# Patient Record
Sex: FEMALE | Race: WHITE
[De-identification: names, ages, dates, MRNs, and addresses within clinical notes are randomized per-mention and may not be internally consistent; named-entity substitution may affect disease eponyms.]

---

## 2017-01-04 ENCOUNTER — HOSPITAL ENCOUNTER (OUTPATIENT)
Dept: HOSPITAL 75 - RAD | Age: 37
End: 2017-01-04
Attending: NURSE PRACTITIONER
Payer: COMMERCIAL

## 2017-01-04 DIAGNOSIS — R22.42: ICD-10-CM

## 2017-01-04 DIAGNOSIS — M79.605: Primary | ICD-10-CM

## 2017-01-04 NOTE — DIAGNOSTIC IMAGING REPORT
PROCEDURE: US left lower extremity venous.



TECHNIQUE: Multiple real-time grayscale images were obtained

over the left lower extremity in various projections. Additional

duplex Doppler and color Doppler images were also obtained.



INDICATION:  Leg pain, swelling and redness.



The femoropopliteal deep venous system was widely patent. No

deep vein thrombosis identified. This occlusion throughout the

mid to lower third of the greater saphenous vein however.



IMPRESSION:

Superficial thrombus involves the greater saphenous throughout

the majority of the thigh. However the femoral popliteal deep

venous system was widely patent.



Dictated by:



Dictated on workstation # [id number]

## 2017-01-04 NOTE — XMS REPORT
Continuity of Care Document

 Created on: 2017



Felicitas Plaza

External Reference #: [id number]

: 1980

Sex: Female



Demographics







 Address  [address] [place]  [address]

 

 Home Phone  [phone]

 

 Preferred Language  English

 

 Marital Status  Unknown

 

 Christianity Affiliation  UNK

 

 Race  Unknown

 

 Ethnic Group  Unknown





Author







 Author  [facility]

 

 Organization  [facility]

 

 Address  Unknown

 

 Phone  Unavailable







Support







 Name  Relationship  Address  Phone

 

 , Nikki Fowler  [employer]  [address]

[place]  [address]  [phone]







Care Team Providers







 Care Team Member Name  Role  Phone

 

 No Pcp, Na  PCP  Unavailable







Source Comments

Some departments are not documenting in the electronic medical record.  If you 
do not see the information that you expected, contact Release of Information in 
the Health Information Management department at [phone] for further 
assistance in locating additional records.[facility]



Active Allergies and Adverse Reactions

No Known Allergies



Current Medications







      



  Prescription   Sig.   Disp.   Refills   Start   End Date   Status



      Date  

 

      



  PRENATAL VIT   Take  by mouth daily.       Active



  W-CA,FE,FA(<1 MG)      



  (PRENATAL VITAMIN PO)      

 

      



  CALCIUM CARBONATE/VITAMIN   Take  by mouth daily.       Active



  D3 (CALCIUM + D PO)      







Active Problems







 



  Problem   Noted Date

 

 



  Donor of stem cell   2016







Social History







    



  Tobacco Use   Types   Packs/Day   Years Used   Date

 

    



  Never Smoker    









   



  Smokeless Tobacco: Never   



  Used   







Last Filed Vital Signs







  



  Vital Sign   Reading   Time Taken

 

  



  Blood Pressure   116/76   2016  7:00 AM CDT

 

  



  Pulse   80   2016  7:00 AM CDT

 

  



  Temperature   36.6   C (97.9   F)   2016  7:00 AM CDT

 

  



  Respiratory Rate   16   2016  7:00 AM CDT

 

  



  Height   1.6 m (5' 3")   2016  7:00 AM CDT

 

  



  Weight   67.586 kg (149 lb)   2016  7:00 AM CDT

 

  



  Body Mass Index   26.4   2016  7:00 AM CDT

 

  



  Oxygen Saturation   100%   2016  7:00 AM CDT







Plan of Care







   



  Health Maintenance   Due Date   Last Done   Comments

 

   



  Physical (Comprehensive)   1986  



  Exam   

 

   



  Pertussis Vaccine   1990  

 

   



  Tetanus Vaccine   1996  

 

   



  Cervical Cancer Screening   2000  

 

   



  Influenza Vaccine   2016  







Results from Last 3 Months

Not on file

## 2017-01-17 ENCOUNTER — HOSPITAL ENCOUNTER (OUTPATIENT)
Dept: HOSPITAL 75 - RAD | Age: 37
End: 2017-01-17
Attending: OBSTETRICS & GYNECOLOGY
Payer: COMMERCIAL

## 2017-01-17 DIAGNOSIS — Z12.31: Primary | ICD-10-CM

## 2017-01-17 NOTE — XMS REPORT
Continuity of Care Document

 Created on: 2017



Felicitas Plaza

External Reference #: [id number]

: 1980

Sex: Female



Demographics







 Address  [address] [place]  [address]

 

 Home Phone  [phone]

 

 Preferred Language  English

 

 Marital Status  Unknown

 

 Yarsanism Affiliation  UNK

 

 Race  Unknown

 

 Ethnic Group  Unknown





Author







 Author  [facility]

 

 Organization  [facility]

 

 Address  Unknown

 

 Phone  Unavailable







Support







 Name  Relationship  Address  Phone

 

 , Nikki Fowler  [employer]  [address]

[place]  [address]  [phone]







Care Team Providers







 Care Team Member Name  Role  Phone

 

 No Pcp, Na  PCP  Unavailable







Source Comments

Some departments are not documenting in the electronic medical record.  If you 
do not see the information that you expected, contact Release of Information in 
the Health Information Management department at [phone] for further 
assistance in locating additional records.[facility]



Active Allergies and Adverse Reactions

No Known Allergies



Current Medications







      



  Prescription   Sig.   Disp.   Refills   Start   End Date   Status



      Date  

 

      



  PRENATAL VIT   Take  by mouth daily.       Active



  W-CA,FE,FA(<1 MG)      



  (PRENATAL VITAMIN PO)      

 

      



  CALCIUM CARBONATE/VITAMIN   Take  by mouth daily.       Active



  D3 (CALCIUM + D PO)      







Active Problems







 



  Problem   Noted Date

 

 



  Donor of stem cell   2016







Social History







    



  Tobacco Use   Types   Packs/Day   Years Used   Date

 

    



  Never Smoker    









   



  Smokeless Tobacco: Never   



  Used   







Last Filed Vital Signs







  



  Vital Sign   Reading   Time Taken

 

  



  Blood Pressure   116/76   2016  7:00 AM CDT

 

  



  Pulse   80   2016  7:00 AM CDT

 

  



  Temperature   36.6   C (97.9   F)   2016  7:00 AM CDT

 

  



  Respiratory Rate   16   2016  7:00 AM CDT

 

  



  Height   1.6 m (5' 3")   2016  7:00 AM CDT

 

  



  Weight   67.586 kg (149 lb)   2016  7:00 AM CDT

 

  



  Body Mass Index   26.4   2016  7:00 AM CDT

 

  



  Oxygen Saturation   100%   2016  7:00 AM CDT







Plan of Care







   



  Health Maintenance   Due Date   Last Done   Comments

 

   



  Physical (Comprehensive)   1986  



  Exam   

 

   



  Pertussis Vaccine   1990  

 

   



  Tetanus Vaccine   1996  

 

   



  Cervical Cancer Screening   2000  

 

   



  Influenza Vaccine   2016  







Results from Last 3 Months

Not on file

## 2017-01-17 NOTE — DIAGNOSTIC IMAGING REPORT
Bilateral screening mammogram.



The current study was also evaluated with a Computer Aided

Detection (CAD) system.



INDICATION: Screening. No current complaints stated on the

questionnaire.



COMPARISON: None. This is a baseline study.



FINDINGS:



The breasts are composed of heterogeneously dense parenchyma

which may decrease mammographic sensitivity. There are punctate

benign-appearing calcifications seen. Allowing for technique and

positional differences, no suspicious change is seen.



IMPRESSION: Dense breasts with no definite change.



ACR BI-RADS Category 2: Benign findings.

Result letter will be mailed to the patient.

Note: At least 10% of breast cancer is not imaged by mammography.



Dictated by:



Dictated on workstation # [id number]

## 2018-08-08 ENCOUNTER — HOSPITAL ENCOUNTER (EMERGENCY)
Dept: HOSPITAL 75 - ER | Age: 38
Discharge: HOME | End: 2018-08-08
Payer: COMMERCIAL

## 2018-08-08 VITALS — BODY MASS INDEX: 26.22 KG/M2 | HEIGHT: 63 IN | WEIGHT: 148 LBS

## 2018-08-08 VITALS — DIASTOLIC BLOOD PRESSURE: 85 MMHG | SYSTOLIC BLOOD PRESSURE: 128 MMHG

## 2018-08-08 DIAGNOSIS — I80.01: Primary | ICD-10-CM

## 2018-08-08 NOTE — XMS REPORT
Continuity of Care Document

 Created on: 2013



[place] PLAZA

External Reference #: [id number]

: 1980

Sex: Female



Demographics







 Address  [address] [place]  [address]

 

 Home Phone  [phone]

 

 Preferred Language  Unknown

 

 Marital Status  Unknown

 

 Protestant Affiliation  Unknown

 

 Race  Unknown

 

 Ethnic Group  Unknown





Author







 Author  [facility]

 

 Organization  [facility]

 

 Address  Unknown

 

 Phone  Unavailable







Support







 Name  Relationship  Address  Phone

 

 POP PLAZA  Next Of Kin  [address] [place]  [address] [phone]







Care Team Providers







 Care Team Member Name  Role  Phone

 

 LUISA FUENTES DO  PP  [phone]



                                            



Insurance Providers

                      





 Payer Name                    Policy Number                    Subscriber Name
                    Relationship                

 

 [facility] [facility]                    [id number]                    [place]                    01 Self / Same As Patient                



                                                                    



Advance Directives

                      





 Directive                    Response                    Recorded Date        
        

 

 Advance Directives                    N                    09/15/13 9:26pm    
            

 

 Health Care Power of                     N                    09/15/13 
9:26pm                

 

 Organ Donor                    Y                    09/15/13 9:26pm           
     



                                                                               
         



Problems

          No Known Problems or Medical conditions.                             
                                                           



Allergies, Adverse Reactions, Alerts

                      





 Allergen                    Type                    Severity                   
 Reaction                    Last Updated                

 

 No Known Drug Allergies                                                       
                            09/15/13                



                                                                               
                   



Medications

                      





 Medication                    Dose                    Units                    
Route                    Sig                    Qty                    Days    
            

 

 Prenatal Vit #108/Iron/Fa (Prenatal One Tablet)                    1          
          Each                    PO                    DAILY                  
                                        



                                                                              



Pregnancy

                      





 Response                    Recorded Date/Time                

 

 Status not known                    Unknown                



                                                                              



Results

                      





 Test                    Date                    Result                    
Interp.                    Ref. Range                

 

 Alanine Aminotransferase (ALT/SGPT)                    September 15, 2013 10:
05pm                    27  U/L                    L                    30-65  
              

 

 Albumin                    September 15, 2013 10:05pm                    2.0  G
/DL                    L                    3.4-5.0                

 

 Alkaline Phosphatase                    September 15, 2013 10:05pm            
        121  U/L                    N                                    

 

 Aspartate Amino Transf (AST/SGOT)                    September 15, 2013 10:
05pm                    20  U/L                    N                    15-37  
              

 

 BUN/Creatinine Ratio                    September 15, 2013 10:05pm            
        8                                         -                

 

 Basophils # (Auto)                    September 15, 2013 10:05pm              
      0.0  10^3/uL                    N                    0.0-0.1             
   

 

 Basophils (%) (Auto)                    September 15, 2013 10:05pm            
        0  %                    N                    0-10                

 

 Blood Urea Nitrogen                    September 15, 2013 10:05pm             
       7  MG/DL                    N                    7-18                

 

 Calcium Level                    September 15, 2013 10:05pm                    
7.8  MG/DL                    L                    8.5-10.1                

 

 Carbon Dioxide Level                    September 15, 2013 10:05pm            
        22  MMOL/L                    N                    21-32                

 

 Chloride Level                    September 15, 2013 10:05pm                  
  103  MMOL/L                    N                    101-110                

 

 Creatinine                    September 15, 2013 10:05pm                    
0.9  MG/DL                    N                    0.6-1.3                

 

 Eosinophils # (Auto)                    September 15, 2013 10:05pm            
        0.1  10^3/uL                    N                    0.0-0.3           
     

 

 Eosinophils (%) (Auto)                    September 15, 2013 10:05pm          
          1  %                    N                    0-10                

 

 Glucose Level                    September 15, 2013 10:05pm                    
90  MG/DL                    N                                    

 

 Hematocrit                    September 15, 2013 10:05pm                    35
  %                    N                    35-52                

 

 Hemoglobin                    September 15, 2013 10:05pm                    
12.4  G/DL                    N                    11.5-16.0                

 

 Lymphocytes # (Auto)                    September 15, 2013 10:05pm            
        1.6  X 10^3                    N                    1.0-4.0            
    

 

 Lymphocytes (%) (Auto)                    September 15, 2013 10:05pm          
          13  %                    N                    12-44                

 

 Mean Corpuscular Hemoglobin                    September 15, 2013 10:05pm     
               30  PG                    N                    25-34            
    

 

 Mean Corpuscular Hemoglobin Concent                    September 15, 2013 10:
05pm                    36  G/DL                    N                    32-36 
               

 

 Mean Corpuscular Volume                    September 15, 2013 10:05pm         
           85  FL                    N                    80-99                

 

 Mean Platelet Volume                    September 15, 2013 10:05pm            
        9.4  FL                    N                    7.4-10.4                

 

 Monocytes # (Auto)                    September 15, 2013 10:05pm              
      1.3  X 10^3                    H                    0.0-1.0              
  

 

 Monocytes (%) (Auto)                    September 15, 2013 10:05pm            
        10  %                    N                    0-12                

 

 Neutrophils # (Auto)                    September 15, 2013 10:05pm            
        9.1  X 10^3                    H                    1.8-7.8            
    

 

 Neutrophils (%) (Auto)                    September 15, 2013 10:05pm          
          75  %                    N                    42-75                

 

 Platelet Count                    September 15, 2013 10:05pm                  
  194  10^3/uL                    N                    130-400                

 

 Potassium Level                    September 15, 2013 10:05pm                 
   3.4  MMOL/L                    L                    3.6-5.0                

 

 Red Blood Count                    September 15, 2013 10:05pm                 
   4.08  10^6/uL                    L                    4.35-5.85             
   

 

 Red Cell Distribution Width                    September 15, 2013 10:05pm     
               14.7  %                    H                    10.0-14.5       
         

 

 Sodium Level                    September 15, 2013 10:05pm                    
136  MMOL/L                    N                    135-145                

 

 Total Bilirubin                    September 15, 2013 10:05pm                 
   0.3  MG/DL                    N                    0.0-1.0                

 

 Total Protein                    September 15, 2013 10:05pm                    
5.1  G/DL                    L                    6.4-8.2                

 

 Urine Bacteria                    September 15, 2013 9:50pm                    
NEGATIVE  /HPF                                         -                

 

 Urine Bilirubin                    September 15, 2013 9:50pm                  
  NEGATIVE                                         -                

 

 Urine Casts                    September 15, 2013 9:50pm                    
NONE  /LPF                                         -                

 

 Urine Clarity                    September 15, 2013 9:50pm                    
CLEAR                                         -                

 

 Urine Color                    September 15, 2013 9:50pm                    
YELLOW                                         -                

 

 Urine Crystals                    September 15, 2013 9:50pm                    
NONE  /LPF                                         -                

 

 Urine Culture Indicated                    September 15, 2013 9:50pm          
          YES                                         -                

 

 Urine Glucose (UA)                    September 15, 2013 9:50pm               
     NEGATIVE                                         -                

 

 Urine Ketones                    September 15, 2013 9:50pm                    
NEGATIVE                                         -                

 

 Urine Leukocyte Esterase                    September 15, 2013 9:50pm         
           NEGATIVE                                         -                

 

 Urine Mucus                    September 15, 2013 9:50pm                    
NEGATIVE  /LPF                                         -                

 

 Urine Nitrite                    September 15, 2013 9:50pm                    
NEGATIVE                                         -                

 

 Urine Protein                    September 15, 2013 9:50pm                    
NEGATIVE                                         -                

 

 Urine RBC                    September 15, 2013 9:50pm                    NONE
  /HPF                                         -                

 

 Urine Specific Gravity                    September 15, 2013 9:50pm           
         1.010                    L                    -                

 

 Urine Squamous Epithelial Cells                    September 15, 2013 9:50pm  
                  2-5  /HPF                                         -          
      

 

 Urine Urobilinogen                    September 15, 2013 9:50pm               
     NORMAL  MG/DL                                         -                

 

 Urine WBC                    September 15, 2013 9:50pm                    RARE
  /HPF                                         -                

 

 Urine pH                    September 15, 2013 9:50pm                    7    
                                     -                

 

 White Blood Count                    September 15, 2013 10:05pm               
     12.1  10^3/uL                    H                    4.3-11.0            
    

 

 Estimat Glomerular Filtration Rate                    September 15, 2013 10:
05pm                    > 60                                         -         
       

 

 Urine RBC (Auto)                    September 15, 2013 9:50pm                 
   NEGATIVE                                         -                



                                                                               
                                                                               
                                                                               
                                                                               
                                                                               
                                                                               
                                                                               
                                                                           



Procedures

                      





 Procedure                    Code                    Date                

 

 Urine Culture                                         09/15/13                



                                                                              



Encounters

                      





 Encounter                    Location                    Date/Time            
    

 

 Discharged Inpatient                    [facility]                    09/15/
13 9:15pm

## 2018-08-08 NOTE — ED LOWER EXTREMITY
General


Stated Complaint:  POSS BLOOD CLOT


Source:  patient


Exam Limitations:  no limitations





History of Present Illness


Date Seen by Provider:  Aug 8, 2018


Time Seen by Provider:  21:37


Initial Comments


To ER with reports of possible blood clot in the right lower extremity. She had 

a tender palpable cord lateral and inferior to the knee with sudden onset about 

one hour ago. It  despite the ice pack she applied to it. She also 

had a similar episode to the medial aspect of the left leg measuring 12 inches 

in length a year ago diagnoses a superficial thrombophlebitis. She feels this 

is probably the same thing but wants to ensure that the deep veins are patent. 

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 She reports she does 

have a lot of troubles with varicose veins.


Onset:  just prior to arrival


Severity:  moderate


Pain/Injury Location:  right leg


Method of Injury:  unknown (No known injury)


Modifying Factors:  Worse With Other (palpation worsens the pain)





Allergies and Home Medications


Allergies


Coded Allergies:  


     No Known Drug Allergies (Unverified , 9/15/13)





Home Medications


Calcium Carbonate 500 Mg Tab.chew, 1,000 MG PO BID, (Reported)


Famotidine 20 Mg Tablet, 20 MG GT BID, (Reported)


Hydrocodone Bit/Acetaminophen 1 Ea Tab, 1-2 EA PO Q 3H PRN for PA, (Reported)


Ibuprofen 800 Mg Tab, 800 MG PO Q6HR PRN for CRAMPS, (Reported)


Prenatal Vit #108/Iron/Fa 1 Each Tablet, 1 EACH PO DAILY, (Reported)





Patient Home Medication List


Home Medication List Reviewed:  Yes





Constitutional:  see HPI


EENTM:  see HPI


Respiratory:  no symptoms reported


Cardiovascular:  no symptoms reported


Genitourinary:  no symptoms reported


Musculoskeletal:  see HPI


Skin:  see HPI


Psychiatric/Neurological:  No Symptoms Reported





Past Medical-Social-Family Hx


Patient Social History


Recent Foreign Travel:  No


Contact w/Someone Who Travel:  No





Immunizations Up To Date


Tetanus Booster (TDap):  Less than 5yrs


Date of Influenza Vaccine:  Oct 1, 2013





Past Medical History


Reproductive Disorders:  No


Adverse Reaction/Blood Tranf:  No





Family Medical History





Family history: Allergy


  03 MOTHER (PCN)


Family history: Cardiovascular disease


  03 FATHER (Sudden death at 41 years of age.)





Physical Exam


Vital Signs





Vital Signs - First Documented








 8/8/18





 21:27


 


Temp 98.0


 


Pulse 78


 


Resp 20


 


B/P (MAP) 145/81 (102)


 


Pulse Ox 100


 


O2 Delivery Room Air





Capillary Refill :


Height, Weight, BMI


Height: '"


Weight: 165lbs. oz. 74.728484ds;  BMI


Method:


General Appearance:  WD/WN, no apparent distress


HEENT:  PERRL/EOMI, normal ENT inspection


Neck:  non-tender, full range of motion


Respiratory:  no respiratory distress, no accessory muscle use


Hips:  bilateral hip non-tender, bilateral hip normal inspection, bilateral hip 

normal range of motion


Legs:  right leg pain, right leg soft tissue tenderness, right leg other (there 

is a 7-8 cm tender palpable erythematous cord over the lateral aspect of the 

right leg just inferior to the knee consistent with a superficial 

thrombophlebitis.)


Knees:  bilateral knee non-tender, bilateral knee normal inspection, bilateral 

knee normal range of motion


Ankles:  bilateral ankle non-tender, bilateral ankle normal inspection, 

bilateral ankle normal range of motion


Feet:  bilateral foot non-tender, bilateral foot normal inspection, bilateral 

foot normal range of motion


Neurologic/Psychiatric:  alert, normal mood/affect


Skin:  normal color, warm/dry





Progress/Results/Core Measures


Results/Orders


My Orders





Orders - [facility]IA[facility]


 Venous Lower Ext Rt (8/8/18 21:33)





Vital Signs/I&O











 8/8/18





 21:27


 


Temp 98.0


 


Pulse 78


 


Resp 20


 


B/P (MAP) 145/81 (102)


 


Pulse Ox 100


 


O2 Delivery Room Air











Departure


Communication (Admissions)


Ultrasound tech reports there is a 2 cm segment of superficial venous thrombus 

without any involvement of the deep venous system.





Impression





 Primary Impression:  


 Superficial thrombophlebitis


Disposition:  01 HOME, SELF-CARE


Condition:  Stable





Departure-Patient Inst.


Decision time for Depature:  21:39


Referrals:  


LUISA FUENTES DO (PCP/Family)


Primary Care Physician


Patient Instructions:  Superficial Phlebitis





Add. Discharge Instructions:  


1. warm compresses to the area. Antiinflammatories such as aspirin or 

ibuprofen. 


2. Follow up with your doctor next week





Copy


Copies To 1:   LUISA FUENTES PETER J APRN Aug 8, 2018 21:40

## 2018-08-08 NOTE — XMS REPORT
Continuity of Care Document

 Created on: 2013



[facility]

External Reference #: [id number]

: 1980

Sex: Female



Demographics







 Address  [address] [place]  [address]

 

 Home Phone  [phone]

 

 Preferred Language  Unknown

 

 Marital Status  Unknown

 

 Muslim Affiliation  Unknown

 

 Race  Unknown

 

 Ethnic Group  Unknown





Author







 Author  [facility]

 

 Organization  [facility]

 

 Address  Unknown

 

 Phone  Unavailable







Support







 Name  Relationship  Address  Phone

 

 [facility]  Next Of Kin  [address] [place]  [address] [phone]







Care Team Providers







 Care Team Member Name  Role  Phone

 

 LUISA FUENTES DO  PP  [phone]



                                            



Insurance Providers

                      





 Payer Name                    Policy Number                    Subscriber Name
                    Relationship                

 

 [facility]                    [id number]                    [place]                    01 Self / Same As Patient                



                                                                    



Advance Directives

                      





 Directive                    Response                    Recorded Date        
        

 

 Advance Directives                    N                    09/15/13 9:26pm    
            

 

 Health Care Power of                     N                    09/15/13 
9:26pm                

 

 Organ Donor                    Y                    09/15/13 9:26pm           
     



                                                                               
         



Problems

          No Known Problems or Medical conditions.                             
                                                           



Allergies, Adverse Reactions, Alerts

                      





 Allergen                    Type                    Severity                   
 Reaction                    Last Updated                

 

 No Known Drug Allergies                                                       
                            09/15/13                



                                                                               
                   



Medications

                      





 Medication                    Dose                    Units                    
Route                    Sig                    Qty                    Days    
            

 

 Prenatal Vit #108/Iron/Fa (Prenatal One Tablet)                    1          
          Each                    PO                    DAILY                  
                                        



                                                                              



Pregnancy

                      





 Response                    Recorded Date/Time                

 

 Status not known                    Unknown                



                                                                              



Results

                      





 Test                    Date                    Result                    
Interp.                    Ref. Range                

 

 Alanine Aminotransferase (ALT/SGPT)                    September 15, 2013 10:
05pm                    27  U/L                    L                    30-65  
              

 

 Albumin                    September 15, 2013 10:05pm                    2.0  G
/DL                    L                    3.4-5.0                

 

 Alkaline Phosphatase                    September 15, 2013 10:05pm            
        121  U/L                    N                                    

 

 Aspartate Amino Transf (AST/SGOT)                    September 15, 2013 10:
05pm                    20  U/L                    N                    15-37  
              

 

 BUN/Creatinine Ratio                    September 15, 2013 10:05pm            
        8                                         -                

 

 Basophils # (Auto)                    September 15, 2013 10:05pm              
      0.0  10^3/uL                    N                    0.0-0.1             
   

 

 Basophils (%) (Auto)                    September 15, 2013 10:05pm            
        0  %                    N                    0-10                

 

 Blood Urea Nitrogen                    September 15, 2013 10:05pm             
       7  MG/DL                    N                    7-18                

 

 Calcium Level                    September 15, 2013 10:05pm                    
7.8  MG/DL                    L                    8.5-10.1                

 

 Carbon Dioxide Level                    September 15, 2013 10:05pm            
        22  MMOL/L                    N                    21-32                

 

 Chloride Level                    September 15, 2013 10:05pm                  
  103  MMOL/L                    N                    101-110                

 

 Creatinine                    September 15, 2013 10:05pm                    
0.9  MG/DL                    N                    0.6-1.3                

 

 Eosinophils # (Auto)                    September 15, 2013 10:05pm            
        0.1  10^3/uL                    N                    0.0-0.3           
     

 

 Eosinophils (%) (Auto)                    September 15, 2013 10:05pm          
          1  %                    N                    0-10                

 

 Glucose Level                    September 15, 2013 10:05pm                    
90  MG/DL                    N                                    

 

 Hematocrit                    September 15, 2013 10:05pm                    35
  %                    N                    35-52                

 

 Hemoglobin                    September 15, 2013 10:05pm                    
12.4  G/DL                    N                    11.5-16.0                

 

 Lymphocytes # (Auto)                    September 15, 2013 10:05pm            
        1.6  X 10^3                    N                    1.0-4.0            
    

 

 Lymphocytes (%) (Auto)                    September 15, 2013 10:05pm          
          13  %                    N                    12-44                

 

 Mean Corpuscular Hemoglobin                    September 15, 2013 10:05pm     
               30  PG                    N                    25-34            
    

 

 Mean Corpuscular Hemoglobin Concent                    September 15, 2013 10:
05pm                    36  G/DL                    N                    32-36 
               

 

 Mean Corpuscular Volume                    September 15, 2013 10:05pm         
           85  FL                    N                    80-99                

 

 Mean Platelet Volume                    September 15, 2013 10:05pm            
        9.4  FL                    N                    7.4-10.4                

 

 Monocytes # (Auto)                    September 15, 2013 10:05pm              
      1.3  X 10^3                    H                    0.0-1.0              
  

 

 Monocytes (%) (Auto)                    September 15, 2013 10:05pm            
        10  %                    N                    0-12                

 

 Neutrophils # (Auto)                    September 15, 2013 10:05pm            
        9.1  X 10^3                    H                    1.8-7.8            
    

 

 Neutrophils (%) (Auto)                    September 15, 2013 10:05pm          
          75  %                    N                    42-75                

 

 Platelet Count                    September 15, 2013 10:05pm                  
  194  10^3/uL                    N                    130-400                

 

 Potassium Level                    September 15, 2013 10:05pm                 
   3.4  MMOL/L                    L                    3.6-5.0                

 

 Red Blood Count                    September 15, 2013 10:05pm                 
   4.08  10^6/uL                    L                    4.35-5.85             
   

 

 Red Cell Distribution Width                    September 15, 2013 10:05pm     
               14.7  %                    H                    10.0-14.5       
         

 

 Sodium Level                    September 15, 2013 10:05pm                    
136  MMOL/L                    N                    135-145                

 

 Total Bilirubin                    September 15, 2013 10:05pm                 
   0.3  MG/DL                    N                    0.0-1.0                

 

 Total Protein                    September 15, 2013 10:05pm                    
5.1  G/DL                    L                    6.4-8.2                

 

 Urine Bacteria                    September 15, 2013 9:50pm                    
NEGATIVE  /HPF                                         -                

 

 Urine Bilirubin                    September 15, 2013 9:50pm                  
  NEGATIVE                                         -                

 

 Urine Casts                    September 15, 2013 9:50pm                    
NONE  /LPF                                         -                

 

 Urine Clarity                    September 15, 2013 9:50pm                    
CLEAR                                         -                

 

 Urine Color                    September 15, 2013 9:50pm                    
YELLOW                                         -                

 

 Urine Crystals                    September 15, 2013 9:50pm                    
NONE  /LPF                                         -                

 

 Urine Culture Indicated                    September 15, 2013 9:50pm          
          YES                                         -                

 

 Urine Glucose (UA)                    September 15, 2013 9:50pm               
     NEGATIVE                                         -                

 

 Urine Ketones                    September 15, 2013 9:50pm                    
NEGATIVE                                         -                

 

 Urine Leukocyte Esterase                    September 15, 2013 9:50pm         
           NEGATIVE                                         -                

 

 Urine Mucus                    September 15, 2013 9:50pm                    
NEGATIVE  /LPF                                         -                

 

 Urine Nitrite                    September 15, 2013 9:50pm                    
NEGATIVE                                         -                

 

 Urine Protein                    September 15, 2013 9:50pm                    
NEGATIVE                                         -                

 

 Urine RBC                    September 15, 2013 9:50pm                    NONE
  /HPF                                         -                

 

 Urine Specific Gravity                    September 15, 2013 9:50pm           
         1.010                    L                    -                

 

 Urine Squamous Epithelial Cells                    September 15, 2013 9:50pm  
                  2-5  /HPF                                         -          
      

 

 Urine Urobilinogen                    September 15, 2013 9:50pm               
     NORMAL  MG/DL                                         -                

 

 Urine WBC                    September 15, 2013 9:50pm                    RARE
  /HPF                                         -                

 

 Urine pH                    September 15, 2013 9:50pm                    7    
                                     -                

 

 White Blood Count                    September 15, 2013 10:05pm               
     12.1  10^3/uL                    H                    4.3-11.0            
    

 

 Estimat Glomerular Filtration Rate                    September 15, 2013 10:
05pm                    > 60                                         -         
       

 

 Urine RBC (Auto)                    September 15, 2013 9:50pm                 
   NEGATIVE                                         -                



                                                                               
                                                                               
                                                                               
                                                                               
                                                                               
                                                                               
                                                                               
                                                                           



Procedures

                      





 Procedure                    Code                    Date                

 

 Urine Culture                                         09/15/13                



                                                                              



Encounters

                      





 Encounter                    Location                    Date/Time            
    

 

 Discharged Inpatient                    [facility]                    09/15/
13 9:15pm

## 2018-08-08 NOTE — XMS REPORT
Clinical Summary

 Created on: 2018



[facility]

External Reference #: [id number]

: 1980

Sex: Female



Demographics







 Address  [address] [place]  [address]

 

 Home Phone  [phone]

 

 Preferred Language  English

 

 Marital Status  Unknown

 

 Christian Affiliation  [employer]

 

 Race  Unknown

 

 Ethnic Group  Unknown





Author







 Author  [facility]

 

 Organization  [facility]

 

 Address  Unknown

 

 Phone  Unavailable







Support







 Name  Relationship  Address  Phone

 

 Nikki Fowler  [employer]  [address]

[place]  [address]  [phone]







Care Team Providers







 Care Team Member Name  Role  Phone

 

 Nakita Salinas RN  Unavailable  Unavailable

 

 No Pcp, Na  PCP  Unavailable

 

 Kirk Souza MD  Unavailable  [phone]

 

 Claudia Patel PHARMD  Unavailable  Unavailable

 

 Kristen Pena PHARMD  Unavailable  Unavailable







Source Comments

Some departments are not documenting in the electronic medical record.  If you 
do not see the information that you expected, contact Release of Information in 
the Health Information Management department at [phone] for further 
assistance in locating additional records.[facility]



Allergies

No Known Allergies



Current Medications







      



  Prescription   Sig.   Disp.   Refills   Start   End Date   Status



      Date  

 

      



  PRENATAL VIT   Take  by mouth daily.       Active



  W-CA,FE,FA(<1 MG)      



  (PRENATAL VITAMIN PO)      

 

      



  CALCIUM CARBONATE/VITAMIN   Take  by mouth daily.       Active



  D3 (CALCIUM + D PO)      







Active Problems







 



  Problem   Noted Date

 

 



  Donor of stem cell   2016







Family History







   



  Medical History   Relation   Name   Comments

 

   



  Cancer   Sister    a









   



  Relation   Name   Status   Comments

 

   



  Sister   







Social History







    



  Tobacco Use   Types   Packs/Day   Years Used   Date

 

    



  Never Smoker    

 

    



  Smokeless Tobacco: Never   



  Used   









 



  Sex Assigned at Birth   Date Recorded

 

 



  Not on file 







Last Filed Vital Signs







  



  Vital Sign   Reading   Time Taken

 

  



  Blood Pressure   116/76   2016  7:00 AM CDT

 

  



  Pulse   80   2016  7:00 AM CDT

 

  



  Temperature   36.6 C (97.9 F)   2016  7:00 AM CDT

 

  



  Respiratory Rate   16   2016  7:00 AM CDT

 

  



  Oxygen Saturation   100%   2016  7:00 AM CDT

 

  



  Inhaled Oxygen   -   -



  Concentration  

 

  



  Weight   67.6 kg (149 lb)   2016  7:00 AM CDT

 

  



  Height   160 cm (5' 3")   2016  7:00 AM CDT

 

  



  Body Mass Index   26.39   2016  7:00 AM CDT







Plan of Treatment







   



  Health Maintenance   Due Date   Last Done   Comments

 

   



  PHYSICAL (COMPREHENSIVE)   1986  



  EXAM   

 

   



  PERTUSSIS VACCINE   1990  

 

   



  HIV SCREENING   1994  

 

   



  TETANUS VACCINE   1996  

 

   



  CERVICAL CANCER SCREENING   2009  

 

   



  INFLUENZA VACCINE   10/01/2018  







Results

Not on filefrom Last 3 Months

## 2018-08-08 NOTE — XMS REPORT
Continuity of Care Document

 Created on: 2013



[facility]

External Reference #: [id number]

: 1980

Sex: Female



Demographics







 Address  [address] [place]  [address]

 

 Home Phone  [phone]

 

 Preferred Language  Unknown

 

 Marital Status  Unknown

 

 Yarsanism Affiliation  Unknown

 

 Race  Unknown

 

 Ethnic Group  Unknown





Author







 Author  [facility]

 

 Organization  [facility]

 

 Address  Unknown

 

 Phone  Unavailable







Support







 Name  Relationship  Address  Phone

 

 [facility]  Next Of Kin  [address] [place]  [address] [phone]







Care Team Providers







 Care Team Member Name  Role  Phone

 

 LUISA FUENTES DO  PP  [phone]



                                            



Insurance Providers

                      





 Payer Name                    Policy Number                    Subscriber Name
                    Relationship                

 

 [facility]                    [id number]                    [place]                    01 Self / Same As Patient                



                                                                    



Advance Directives

                      





 Directive                    Response                    Recorded Date        
        

 

 Advance Directives                    N                    09/15/13 9:26pm    
            

 

 Health Care Power of                     N                    09/15/13 
9:26pm                

 

 Organ Donor                    Y                    09/15/13 9:26pm           
     



                                                                               
         



Problems

          No Known Problems or Medical conditions.                             
                                                           



Allergies, Adverse Reactions, Alerts

                      





 Allergen                    Type                    Severity                   
 Reaction                    Last Updated                

 

 No Known Drug Allergies                                                       
                            09/15/13                



                                                                               
                   



Medications

                      





 Medication                    Dose                    Units                    
Route                    Sig                    Qty                    Days    
            

 

 Prenatal Vit #108/Iron/Fa (Prenatal One Tablet)                    1          
          Each                    PO                    DAILY                  
                                        



                                                                              



Pregnancy

                      





 Response                    Recorded Date/Time                

 

 Status not known                    Unknown                



                                                                              



Results

                      





 Test                    Date                    Result                    
Interp.                    Ref. Range                

 

 Alanine Aminotransferase (ALT/SGPT)                    September 15, 2013 10:
05pm                    27  U/L                    L                    30-65  
              

 

 Albumin                    September 15, 2013 10:05pm                    2.0  G
/DL                    L                    3.4-5.0                

 

 Alkaline Phosphatase                    September 15, 2013 10:05pm            
        121  U/L                    N                                    

 

 Aspartate Amino Transf (AST/SGOT)                    September 15, 2013 10:
05pm                    20  U/L                    N                    15-37  
              

 

 BUN/Creatinine Ratio                    September 15, 2013 10:05pm            
        8                                         -                

 

 Basophils # (Auto)                    September 15, 2013 10:05pm              
      0.0  10^3/uL                    N                    0.0-0.1             
   

 

 Basophils (%) (Auto)                    September 15, 2013 10:05pm            
        0  %                    N                    0-10                

 

 Blood Urea Nitrogen                    September 15, 2013 10:05pm             
       7  MG/DL                    N                    7-18                

 

 Calcium Level                    September 15, 2013 10:05pm                    
7.8  MG/DL                    L                    8.5-10.1                

 

 Carbon Dioxide Level                    September 15, 2013 10:05pm            
        22  MMOL/L                    N                    21-32                

 

 Chloride Level                    September 15, 2013 10:05pm                  
  103  MMOL/L                    N                    101-110                

 

 Creatinine                    September 15, 2013 10:05pm                    
0.9  MG/DL                    N                    0.6-1.3                

 

 Eosinophils # (Auto)                    September 15, 2013 10:05pm            
        0.1  10^3/uL                    N                    0.0-0.3           
     

 

 Eosinophils (%) (Auto)                    September 15, 2013 10:05pm          
          1  %                    N                    0-10                

 

 Glucose Level                    September 15, 2013 10:05pm                    
90  MG/DL                    N                                    

 

 Hematocrit                    September 15, 2013 10:05pm                    35
  %                    N                    35-52                

 

 Hemoglobin                    September 15, 2013 10:05pm                    
12.4  G/DL                    N                    11.5-16.0                

 

 Lymphocytes # (Auto)                    September 15, 2013 10:05pm            
        1.6  X 10^3                    N                    1.0-4.0            
    

 

 Lymphocytes (%) (Auto)                    September 15, 2013 10:05pm          
          13  %                    N                    12-44                

 

 Mean Corpuscular Hemoglobin                    September 15, 2013 10:05pm     
               30  PG                    N                    25-34            
    

 

 Mean Corpuscular Hemoglobin Concent                    September 15, 2013 10:
05pm                    36  G/DL                    N                    32-36 
               

 

 Mean Corpuscular Volume                    September 15, 2013 10:05pm         
           85  FL                    N                    80-99                

 

 Mean Platelet Volume                    September 15, 2013 10:05pm            
        9.4  FL                    N                    7.4-10.4                

 

 Monocytes # (Auto)                    September 15, 2013 10:05pm              
      1.3  X 10^3                    H                    0.0-1.0              
  

 

 Monocytes (%) (Auto)                    September 15, 2013 10:05pm            
        10  %                    N                    0-12                

 

 Neutrophils # (Auto)                    September 15, 2013 10:05pm            
        9.1  X 10^3                    H                    1.8-7.8            
    

 

 Neutrophils (%) (Auto)                    September 15, 2013 10:05pm          
          75  %                    N                    42-75                

 

 Platelet Count                    September 15, 2013 10:05pm                  
  194  10^3/uL                    N                    130-400                

 

 Potassium Level                    September 15, 2013 10:05pm                 
   3.4  MMOL/L                    L                    3.6-5.0                

 

 Red Blood Count                    September 15, 2013 10:05pm                 
   4.08  10^6/uL                    L                    4.35-5.85             
   

 

 Red Cell Distribution Width                    September 15, 2013 10:05pm     
               14.7  %                    H                    10.0-14.5       
         

 

 Sodium Level                    September 15, 2013 10:05pm                    
136  MMOL/L                    N                    135-145                

 

 Total Bilirubin                    September 15, 2013 10:05pm                 
   0.3  MG/DL                    N                    0.0-1.0                

 

 Total Protein                    September 15, 2013 10:05pm                    
5.1  G/DL                    L                    6.4-8.2                

 

 Urine Bacteria                    September 15, 2013 9:50pm                    
NEGATIVE  /HPF                                         -                

 

 Urine Bilirubin                    September 15, 2013 9:50pm                  
  NEGATIVE                                         -                

 

 Urine Casts                    September 15, 2013 9:50pm                    
NONE  /LPF                                         -                

 

 Urine Clarity                    September 15, 2013 9:50pm                    
CLEAR                                         -                

 

 Urine Color                    September 15, 2013 9:50pm                    
YELLOW                                         -                

 

 Urine Crystals                    September 15, 2013 9:50pm                    
NONE  /LPF                                         -                

 

 Urine Culture Indicated                    September 15, 2013 9:50pm          
          YES                                         -                

 

 Urine Glucose (UA)                    September 15, 2013 9:50pm               
     NEGATIVE                                         -                

 

 Urine Ketones                    September 15, 2013 9:50pm                    
NEGATIVE                                         -                

 

 Urine Leukocyte Esterase                    September 15, 2013 9:50pm         
           NEGATIVE                                         -                

 

 Urine Mucus                    September 15, 2013 9:50pm                    
NEGATIVE  /LPF                                         -                

 

 Urine Nitrite                    September 15, 2013 9:50pm                    
NEGATIVE                                         -                

 

 Urine Protein                    September 15, 2013 9:50pm                    
NEGATIVE                                         -                

 

 Urine RBC                    September 15, 2013 9:50pm                    NONE
  /HPF                                         -                

 

 Urine Specific Gravity                    September 15, 2013 9:50pm           
         1.010                    L                    -                

 

 Urine Squamous Epithelial Cells                    September 15, 2013 9:50pm  
                  2-5  /HPF                                         -          
      

 

 Urine Urobilinogen                    September 15, 2013 9:50pm               
     NORMAL  MG/DL                                         -                

 

 Urine WBC                    September 15, 2013 9:50pm                    RARE
  /HPF                                         -                

 

 Urine pH                    September 15, 2013 9:50pm                    7    
                                     -                

 

 White Blood Count                    September 15, 2013 10:05pm               
     12.1  10^3/uL                    H                    4.3-11.0            
    

 

 Estimat Glomerular Filtration Rate                    September 15, 2013 10:
05pm                    > 60                                         -         
       

 

 Urine RBC (Auto)                    September 15, 2013 9:50pm                 
   NEGATIVE                                         -                



                                                                               
                                                                               
                                                                               
                                                                               
                                                                               
                                                                               
                                                                               
                                                                           



Procedures

                      





 Procedure                    Code                    Date                

 

 Urine Culture                                         09/15/13                



                                                                              



Encounters

                      





 Encounter                    Location                    Date/Time            
    

 

 Discharged Inpatient                    [facility]                    09/15/
13 9:15pm

## 2018-08-08 NOTE — XMS REPORT
Continuity of Care Document

 Created on: 2013



[facility]

External Reference #: [id number]

: 1980

Sex: Female



Demographics







 Address  [address] [place]  [address]

 

 Home Phone  [phone]

 

 Preferred Language  Unknown

 

 Marital Status  Unknown

 

 Latter-day Affiliation  Unknown

 

 Race  Unknown

 

 Ethnic Group  Unknown





Author







 Author  [facility]

 

 Organization  [facility]

 

 Address  Unknown

 

 Phone  Unavailable







Support







 Name  Relationship  Address  Phone

 

 [facility]  Next Of Kin  [address] [place]  [address] [phone]







Care Team Providers







 Care Team Member Name  Role  Phone

 

 LUISA FUENTES DO  PP  [phone]



                                            



Insurance Providers

                      





 Payer Name                    Policy Number                    Subscriber Name
                    Relationship                

 

 [employer]                    [id number]                    [place]                    01 Self / Same As Patient                



                                                                    



Advance Directives

                      





 Directive                    Response                    Recorded Date        
        

 

 Advance Directives                    N                    09/15/13 9:26pm    
            

 

 Health Care Power of                     N                    09/15/13 
9:26pm                

 

 Organ Donor                    Y                    09/15/13 9:26pm           
     



                                                                               
         



Problems

          No Known Problems or Medical conditions.                             
                                                           



Allergies, Adverse Reactions, Alerts

                      





 Allergen                    Type                    Severity                   
 Reaction                    Last Updated                

 

 No Known Drug Allergies                                                       
                            09/15/13                



                                                                               
                   



Medications

                      





 Medication                    Dose                    Units                    
Route                    Sig                    Qty                    Days    
            

 

 Prenatal Vit #108/Iron/Fa (Prenatal One Tablet)                    1          
          Each                    PO                    DAILY                  
                                        



                                                                              



Pregnancy

                      





 Response                    Recorded Date/Time                

 

 Status not known                    Unknown                



                                                                              



Results

                      





 Test                    Date                    Result                    
Interp.                    Ref. Range                

 

 Alanine Aminotransferase (ALT/SGPT)                    September 15, 2013 10:
05pm                    27  U/L                    L                    30-65  
              

 

 Albumin                    September 15, 2013 10:05pm                    2.0  G
/DL                    L                    3.4-5.0                

 

 Alkaline Phosphatase                    September 15, 2013 10:05pm            
        121  U/L                    N                                    

 

 Aspartate Amino Transf (AST/SGOT)                    September 15, 2013 10:
05pm                    20  U/L                    N                    15-37  
              

 

 BUN/Creatinine Ratio                    September 15, 2013 10:05pm            
        8                                         -                

 

 Basophils # (Auto)                    September 15, 2013 10:05pm              
      0.0  10^3/uL                    N                    0.0-0.1             
   

 

 Basophils (%) (Auto)                    September 15, 2013 10:05pm            
        0  %                    N                    0-10                

 

 Blood Urea Nitrogen                    September 15, 2013 10:05pm             
       7  MG/DL                    N                    7-18                

 

 Calcium Level                    September 15, 2013 10:05pm                    
7.8  MG/DL                    L                    8.5-10.1                

 

 Carbon Dioxide Level                    September 15, 2013 10:05pm            
        22  MMOL/L                    N                    21-32                

 

 Chloride Level                    September 15, 2013 10:05pm                  
  103  MMOL/L                    N                    101-110                

 

 Creatinine                    September 15, 2013 10:05pm                    
0.9  MG/DL                    N                    0.6-1.3                

 

 Eosinophils # (Auto)                    September 15, 2013 10:05pm            
        0.1  10^3/uL                    N                    0.0-0.3           
     

 

 Eosinophils (%) (Auto)                    September 15, 2013 10:05pm          
          1  %                    N                    0-10                

 

 Glucose Level                    September 15, 2013 10:05pm                    
90  MG/DL                    N                                    

 

 Hematocrit                    September 15, 2013 10:05pm                    35
  %                    N                    35-52                

 

 Hemoglobin                    September 15, 2013 10:05pm                    
12.4  G/DL                    N                    11.5-16.0                

 

 Lymphocytes # (Auto)                    September 15, 2013 10:05pm            
        1.6  X 10^3                    N                    1.0-4.0            
    

 

 Lymphocytes (%) (Auto)                    September 15, 2013 10:05pm          
          13  %                    N                    12-44                

 

 Mean Corpuscular Hemoglobin                    September 15, 2013 10:05pm     
               30  PG                    N                    25-34            
    

 

 Mean Corpuscular Hemoglobin Concent                    September 15, 2013 10:
05pm                    36  G/DL                    N                    32-36 
               

 

 Mean Corpuscular Volume                    September 15, 2013 10:05pm         
           85  FL                    N                    80-99                

 

 Mean Platelet Volume                    September 15, 2013 10:05pm            
        9.4  FL                    N                    7.4-10.4                

 

 Monocytes # (Auto)                    September 15, 2013 10:05pm              
      1.3  X 10^3                    H                    0.0-1.0              
  

 

 Monocytes (%) (Auto)                    September 15, 2013 10:05pm            
        10  %                    N                    0-12                

 

 Neutrophils # (Auto)                    September 15, 2013 10:05pm            
        9.1  X 10^3                    H                    1.8-7.8            
    

 

 Neutrophils (%) (Auto)                    September 15, 2013 10:05pm          
          75  %                    N                    42-75                

 

 Platelet Count                    September 15, 2013 10:05pm                  
  194  10^3/uL                    N                    130-400                

 

 Potassium Level                    September 15, 2013 10:05pm                 
   3.4  MMOL/L                    L                    3.6-5.0                

 

 Red Blood Count                    September 15, 2013 10:05pm                 
   4.08  10^6/uL                    L                    4.35-5.85             
   

 

 Red Cell Distribution Width                    September 15, 2013 10:05pm     
               14.7  %                    H                    10.0-14.5       
         

 

 Sodium Level                    September 15, 2013 10:05pm                    
136  MMOL/L                    N                    135-145                

 

 Total Bilirubin                    September 15, 2013 10:05pm                 
   0.3  MG/DL                    N                    0.0-1.0                

 

 Total Protein                    September 15, 2013 10:05pm                    
5.1  G/DL                    L                    6.4-8.2                

 

 Urine Bacteria                    September 15, 2013 9:50pm                    
NEGATIVE  /HPF                                         -                

 

 Urine Bilirubin                    September 15, 2013 9:50pm                  
  NEGATIVE                                         -                

 

 Urine Casts                    September 15, 2013 9:50pm                    
NONE  /LPF                                         -                

 

 Urine Clarity                    September 15, 2013 9:50pm                    
CLEAR                                         -                

 

 Urine Color                    September 15, 2013 9:50pm                    
YELLOW                                         -                

 

 Urine Crystals                    September 15, 2013 9:50pm                    
NONE  /LPF                                         -                

 

 Urine Culture Indicated                    September 15, 2013 9:50pm          
          YES                                         -                

 

 Urine Glucose (UA)                    September 15, 2013 9:50pm               
     NEGATIVE                                         -                

 

 Urine Ketones                    September 15, 2013 9:50pm                    
NEGATIVE                                         -                

 

 Urine Leukocyte Esterase                    September 15, 2013 9:50pm         
           NEGATIVE                                         -                

 

 Urine Mucus                    September 15, 2013 9:50pm                    
NEGATIVE  /LPF                                         -                

 

 Urine Nitrite                    September 15, 2013 9:50pm                    
NEGATIVE                                         -                

 

 Urine Protein                    September 15, 2013 9:50pm                    
NEGATIVE                                         -                

 

 Urine RBC                    September 15, 2013 9:50pm                    NONE
  /HPF                                         -                

 

 Urine Specific Gravity                    September 15, 2013 9:50pm           
         1.010                    L                    -                

 

 Urine Squamous Epithelial Cells                    September 15, 2013 9:50pm  
                  2-5  /HPF                                         -          
      

 

 Urine Urobilinogen                    September 15, 2013 9:50pm               
     NORMAL  MG/DL                                         -                

 

 Urine WBC                    September 15, 2013 9:50pm                    RARE
  /HPF                                         -                

 

 Urine pH                    September 15, 2013 9:50pm                    7    
                                     -                

 

 White Blood Count                    September 15, 2013 10:05pm               
     12.1  10^3/uL                    H                    4.3-11.0            
    

 

 Estimat Glomerular Filtration Rate                    September 15, 2013 10:
05pm                    > 60                                         -         
       

 

 Urine RBC (Auto)                    September 15, 2013 9:50pm                 
   NEGATIVE                                         -                



                                                                               
                                                                               
                                                                               
                                                                               
                                                                               
                                                                               
                                                                               
                                                                           



Procedures

                      





 Procedure                    Code                    Date                

 

 Urine Culture                                         09/15/13                



                                                                              



Encounters

                      





 Encounter                    Location                    Date/Time            
    

 

 Discharged Inpatient                    [facility]                    09/15/
13 9:15pm

## 2018-08-08 NOTE — XMS REPORT
Continuity of Care Document

 Created on: 2013



CLOVER PLAZA

External Reference #: [id number]

: 1980

Sex: Female



Demographics







 Address  [address] [place]  [address]

 

 Home Phone  [phone]

 

 Preferred Language  Unknown

 

 Marital Status  Unknown

 

 Congregation Affiliation  Unknown

 

 Race  Unknown

 

 Ethnic Group  Unknown





Author







 Author  [facility]

 

 Organization  [facility]

 

 Address  Unknown

 

 Phone  Unavailable







Support







 Name  Relationship  Address  Phone

 

 POP PLAZA  Next Of Kin  [address] [place]  [address] [phone]







Care Team Providers







 Care Team Member Name  Role  Phone

 

 LUISA FUENTES DO  PP  [phone]



                                            



Insurance Providers

                      





 Payer Name                    Policy Number                    Subscriber Name
                    Relationship                

 

 [facility]                    [id number]                    [place]                    01 Self / Same As Patient                



                                                                    



Advance Directives

                      





 Directive                    Response                    Recorded Date        
        

 

 Advance Directives                    N                    09/15/13 9:26pm    
            

 

 Health Care Power of                     N                    09/15/13 
9:26pm                

 

 Organ Donor                    Y                    09/15/13 9:26pm           
     



                                                                               
         



Problems

          No Known Problems or Medical conditions.                             
                                                           



Allergies, Adverse Reactions, Alerts

                      





 Allergen                    Type                    Severity                   
 Reaction                    Last Updated                

 

 No Known Drug Allergies                                                       
                            09/15/13                



                                                                               
                   



Medications

                      





 Medication                    Dose                    Units                    
Route                    Sig                    Qty                    Days    
            

 

 Prenatal Vit #108/Iron/Fa (Prenatal One Tablet)                    1          
          Each                    PO                    DAILY                  
                                        



                                                                              



Pregnancy

                      





 Response                    Recorded Date/Time                

 

 Status not known                    Unknown                



                                                                              



Results

                      





 Test                    Date                    Result                    
Interp.                    Ref. Range                

 

 Alanine Aminotransferase (ALT/SGPT)                    September 15, 2013 10:
05pm                    27  U/L                    L                    30-65  
              

 

 Albumin                    September 15, 2013 10:05pm                    2.0  G
/DL                    L                    3.4-5.0                

 

 Alkaline Phosphatase                    September 15, 2013 10:05pm            
        121  U/L                    N                                    

 

 Aspartate Amino Transf (AST/SGOT)                    September 15, 2013 10:
05pm                    20  U/L                    N                    15-37  
              

 

 BUN/Creatinine Ratio                    September 15, 2013 10:05pm            
        8                                         -                

 

 Basophils # (Auto)                    September 15, 2013 10:05pm              
      0.0  10^3/uL                    N                    0.0-0.1             
   

 

 Basophils (%) (Auto)                    September 15, 2013 10:05pm            
        0  %                    N                    0-10                

 

 Blood Urea Nitrogen                    September 15, 2013 10:05pm             
       7  MG/DL                    N                    7-18                

 

 Calcium Level                    September 15, 2013 10:05pm                    
7.8  MG/DL                    L                    8.5-10.1                

 

 Carbon Dioxide Level                    September 15, 2013 10:05pm            
        22  MMOL/L                    N                    21-32                

 

 Chloride Level                    September 15, 2013 10:05pm                  
  103  MMOL/L                    N                    101-110                

 

 Creatinine                    September 15, 2013 10:05pm                    
0.9  MG/DL                    N                    0.6-1.3                

 

 Eosinophils # (Auto)                    September 15, 2013 10:05pm            
        0.1  10^3/uL                    N                    0.0-0.3           
     

 

 Eosinophils (%) (Auto)                    September 15, 2013 10:05pm          
          1  %                    N                    0-10                

 

 Glucose Level                    September 15, 2013 10:05pm                    
90  MG/DL                    N                                    

 

 Hematocrit                    September 15, 2013 10:05pm                    35
  %                    N                    35-52                

 

 Hemoglobin                    September 15, 2013 10:05pm                    
12.4  G/DL                    N                    11.5-16.0                

 

 Lymphocytes # (Auto)                    September 15, 2013 10:05pm            
        1.6  X 10^3                    N                    1.0-4.0            
    

 

 Lymphocytes (%) (Auto)                    September 15, 2013 10:05pm          
          13  %                    N                    12-44                

 

 Mean Corpuscular Hemoglobin                    September 15, 2013 10:05pm     
               30  PG                    N                    25-34            
    

 

 Mean Corpuscular Hemoglobin Concent                    September 15, 2013 10:
05pm                    36  G/DL                    N                    32-36 
               

 

 Mean Corpuscular Volume                    September 15, 2013 10:05pm         
           85  FL                    N                    80-99                

 

 Mean Platelet Volume                    September 15, 2013 10:05pm            
        9.4  FL                    N                    7.4-10.4                

 

 Monocytes # (Auto)                    September 15, 2013 10:05pm              
      1.3  X 10^3                    H                    0.0-1.0              
  

 

 Monocytes (%) (Auto)                    September 15, 2013 10:05pm            
        10  %                    N                    0-12                

 

 Neutrophils # (Auto)                    September 15, 2013 10:05pm            
        9.1  X 10^3                    H                    1.8-7.8            
    

 

 Neutrophils (%) (Auto)                    September 15, 2013 10:05pm          
          75  %                    N                    42-75                

 

 Platelet Count                    September 15, 2013 10:05pm                  
  194  10^3/uL                    N                    130-400                

 

 Potassium Level                    September 15, 2013 10:05pm                 
   3.4  MMOL/L                    L                    3.6-5.0                

 

 Red Blood Count                    September 15, 2013 10:05pm                 
   4.08  10^6/uL                    L                    4.35-5.85             
   

 

 Red Cell Distribution Width                    September 15, 2013 10:05pm     
               14.7  %                    H                    10.0-14.5       
         

 

 Sodium Level                    September 15, 2013 10:05pm                    
136  MMOL/L                    N                    135-145                

 

 Total Bilirubin                    September 15, 2013 10:05pm                 
   0.3  MG/DL                    N                    0.0-1.0                

 

 Total Protein                    September 15, 2013 10:05pm                    
5.1  G/DL                    L                    6.4-8.2                

 

 Urine Bacteria                    September 15, 2013 9:50pm                    
NEGATIVE  /HPF                                         -                

 

 Urine Bilirubin                    September 15, 2013 9:50pm                  
  NEGATIVE                                         -                

 

 Urine Casts                    September 15, 2013 9:50pm                    
NONE  /LPF                                         -                

 

 Urine Clarity                    September 15, 2013 9:50pm                    
CLEAR                                         -                

 

 Urine Color                    September 15, 2013 9:50pm                    
YELLOW                                         -                

 

 Urine Crystals                    September 15, 2013 9:50pm                    
NONE  /LPF                                         -                

 

 Urine Culture Indicated                    September 15, 2013 9:50pm          
          YES                                         -                

 

 Urine Glucose (UA)                    September 15, 2013 9:50pm               
     NEGATIVE                                         -                

 

 Urine Ketones                    September 15, 2013 9:50pm                    
NEGATIVE                                         -                

 

 Urine Leukocyte Esterase                    September 15, 2013 9:50pm         
           NEGATIVE                                         -                

 

 Urine Mucus                    September 15, 2013 9:50pm                    
NEGATIVE  /LPF                                         -                

 

 Urine Nitrite                    September 15, 2013 9:50pm                    
NEGATIVE                                         -                

 

 Urine Protein                    September 15, 2013 9:50pm                    
NEGATIVE                                         -                

 

 Urine RBC                    September 15, 2013 9:50pm                    NONE
  /HPF                                         -                

 

 Urine Specific Gravity                    September 15, 2013 9:50pm           
         1.010                    L                    -                

 

 Urine Squamous Epithelial Cells                    September 15, 2013 9:50pm  
                  2-5  /HPF                                         -          
      

 

 Urine Urobilinogen                    September 15, 2013 9:50pm               
     NORMAL  MG/DL                                         -                

 

 Urine WBC                    September 15, 2013 9:50pm                    RARE
  /HPF                                         -                

 

 Urine pH                    September 15, 2013 9:50pm                    7    
                                     -                

 

 White Blood Count                    September 15, 2013 10:05pm               
     12.1  10^3/uL                    H                    4.3-11.0            
    

 

 Estimat Glomerular Filtration Rate                    September 15, 2013 10:
05pm                    > 60                                         -         
       

 

 Urine RBC (Auto)                    September 15, 2013 9:50pm                 
   NEGATIVE                                         -                



                                                                               
                                                                               
                                                                               
                                                                               
                                                                               
                                                                               
                                                                               
                                                                           



Procedures

                      





 Procedure                    Code                    Date                

 

 Urine Culture                                         09/15/13                



                                                                              



Encounters

                      





 Encounter                    Location                    Date/Time            
    

 

 Discharged Inpatient                    [facility]                    09/15/
13 9:15pm

## 2018-08-08 NOTE — XMS REPORT
Continuity of Care Document

 Created on: 2018



CLOVER JARRETT

External Reference #: [id number]

: 1980

Sex: Female



Demographics







 Address  [address] [place]  [address]

 

 Home Phone  [phone] x

 

 Preferred Language  Unknown

 

 Marital Status  Unknown

 

 [employer] Affiliation  Unknown

 

 Race  Unknown

 

 Ethnic Group  Unknown





Author







 Author  [facility]

 

 Organization  [facility]

 

 Address  Unknown

 

 Phone  Unavailable



              



Allergies

      





 Active            Description            Code            Type            
Severity            Reaction            Onset            Reported/Identified   
         Relationship to Patient            Clinical Status        

 

 Yes            No Known Drug Allergies            [id number]            Drug 
Allergy            Unknown            N/A                         09/15/2013   
                               



                  



Medications

      



There is no data.                  



Problems

      





 Date Dx Coded            Attending            Type            Code            
Diagnosis            Diagnosed By        

 

 2013            JEAN PAUL TORRES MD            Ot            
644.03                                  

 

 2013            JEAN PAUL TORRES MD            Ot            
644.03                                  

 

 2013            JEAN PAUL TORRES MD            Ot            650  
                                

 

 2013            JEAN PAUL TORRES MD            Ot            V27.0
                                  

 

 2014                         Ot            241.0                        
          

 

 2014            JEAN PAUL TORRES MD            Ot            729.5
                                  

 

 2014            JEAN PAUL TORRES MD            Ot            
729.81                                  

 

 01/10/2017            CHUY GRANADOS            Ot            M79.605 
           PAIN IN LEFT LEG                     

 

 01/10/2017            CHUY GRANADOS            Ot            R22.42  
          LOCALIZED SWELLING, MASS AND LUMP, LEFT                      

 

 2017            CHUY GRANADOS            Ot            M79.605 
           PAIN IN LEFT LEG                     

 

 2017            CHUY GRANADOS            Ot            R22.42  
          LOCALIZED SWELLING, MASS AND LUMP, LEFT                      

 

 2017            JEAN PAUL TORRES MD, Ot            
Z12.31            ENCNTR SCREEN MAMMOGRAM FOR MALIGNANT NE                     

 

 2017            JEAN PAUL TORRES MD, Ot            
Z12.31            ENCNTR SCREEN MAMMOGRAM FOR MALIGNANT NE                     



                                          



Procedures

      



There is no data.                  



Results

      



There is no data.              



Encounters

      





 ACCT No.            Visit Date/Time            Discharge            Status    
        Pt. Type            Provider            Facility            Loc./Unit  
          Complaint        

 

 [id number]            2017 09:00:00            2017 23:59:59    
        CLS            Outpatient            JEAN PAUL TORRES MD         
   [facility]            RAD            SCREENING        

 

 [id number]            2017 11:02:00            2017 23:59:59    
        CLS            Outpatient            ROBERTACHUY HAJA BONNER            
[facility]            RAD            LEFT LEG PAIN,REDNESS
,SWELLING        

 

 [id number]            2013 08:42:00            2013 19:20:00    
        DIS            Inpatient            JEAN PAUL TORRES MD          
  [facility]                     

 

 [id number]            2013 11:41:00            2013 23:59:59    
        CLS            Outpatient            JEAN PAUL TORRES MD         
   [facility]            RAD                     

 

 [id number]            2013 09:09:00            2013 09:15:00    
        DIS            Outpatient            JEAN PAUL TORRES MD         
   [facility]            [facility]                     

 

 [id number]            09/15/2013 21:17:00            2013 18:49:00    
        DIS            Inpatient            JEAN PAUL TORRES MD          
  [facility]                     

 

 [id number]            09/15/2009 10:15:00                                   
   Document Registration                                                       
     

 

 2018 11:53:23            2018 23:59:59         
   CLS            Outpatient            Linda Woods

## 2018-08-08 NOTE — XMS REPORT
Continuity of Care Document

 Created on: 2013



[facility]

External Reference #: [id number]

: 1980

Sex: Female



Demographics







 Address  [address] [place]  [address]

 

 Home Phone  [phone]

 

 Preferred Language  Unknown

 

 Marital Status  Unknown

 

 [employer] Affiliation  Unknown

 

 Race  Unknown

 

 Ethnic Group  Unknown





Author







 Author  [facility]

 

 Organization  [facility]

 

 Address  Unknown

 

 Phone  Unavailable







Support







 Name  Relationship  Address  Phone

 

 [facility]  Next Of Kin  [address] [place]  [address] [phone]







Care Team Providers







 Care Team Member Name  Role  Phone

 

 LUISA FUENTES DO  PP  [phone]



                                            



Insurance Providers

                      





 Payer Name                    Policy Number                    Subscriber Name
                    Relationship                

 

 [facility]                    [id number]                    [place]                    01 Self / Same As Patient                



                                                                    



Advance Directives

                      





 Directive                    Response                    Recorded Date        
        

 

 Advance Directives                    N                    09/15/13 9:26pm    
            

 

 Health Care Power of                     N                    09/15/13 
9:26pm                

 

 Organ Donor                    Y                    09/15/13 9:26pm           
     



                                                                               
         



Problems

          No Known Problems or Medical conditions.                             
                                                           



Allergies, Adverse Reactions, Alerts

                      





 Allergen                    Type                    Severity                   
 Reaction                    Last Updated                

 

 No Known Drug Allergies                                                       
                            09/15/13                



                                                                               
                   



Medications

                      





 Medication                    Dose                    Units                    
Route                    Sig                    Qty                    Days    
            

 

 Prenatal Vit #108/Iron/Fa (Prenatal One Tablet)                    1          
          Each                    PO                    DAILY                  
                                        



                                                                              



Pregnancy

                      





 Response                    Recorded Date/Time                

 

 Status not known                    Unknown                



                                                                              



Results

                      





 Test                    Date                    Result                    
Interp.                    Ref. Range                

 

 Alanine Aminotransferase (ALT/SGPT)                    September 15, 2013 10:
05pm                    27  U/L                    L                    30-65  
              

 

 Albumin                    September 15, 2013 10:05pm                    2.0  G
/DL                    L                    3.4-5.0                

 

 Alkaline Phosphatase                    September 15, 2013 10:05pm            
        121  U/L                    N                                    

 

 Aspartate Amino Transf (AST/SGOT)                    September 15, 2013 10:
05pm                    20  U/L                    N                    15-37  
              

 

 BUN/Creatinine Ratio                    September 15, 2013 10:05pm            
        8                                         -                

 

 Basophils # (Auto)                    September 15, 2013 10:05pm              
      0.0  10^3/uL                    N                    0.0-0.1             
   

 

 Basophils (%) (Auto)                    September 15, 2013 10:05pm            
        0  %                    N                    0-10                

 

 Blood Urea Nitrogen                    September 15, 2013 10:05pm             
       7  MG/DL                    N                    7-18                

 

 Calcium Level                    September 15, 2013 10:05pm                    
7.8  MG/DL                    L                    8.5-10.1                

 

 Carbon Dioxide Level                    September 15, 2013 10:05pm            
        22  MMOL/L                    N                    21-32                

 

 Chloride Level                    September 15, 2013 10:05pm                  
  103  MMOL/L                    N                    101-110                

 

 Creatinine                    September 15, 2013 10:05pm                    
0.9  MG/DL                    N                    0.6-1.3                

 

 Eosinophils # (Auto)                    September 15, 2013 10:05pm            
        0.1  10^3/uL                    N                    0.0-0.3           
     

 

 Eosinophils (%) (Auto)                    September 15, 2013 10:05pm          
          1  %                    N                    0-10                

 

 Glucose Level                    September 15, 2013 10:05pm                    
90  MG/DL                    N                                    

 

 Hematocrit                    September 15, 2013 10:05pm                    35
  %                    N                    35-52                

 

 Hemoglobin                    September 15, 2013 10:05pm                    
12.4  G/DL                    N                    11.5-16.0                

 

 Lymphocytes # (Auto)                    September 15, 2013 10:05pm            
        1.6  X 10^3                    N                    1.0-4.0            
    

 

 Lymphocytes (%) (Auto)                    September 15, 2013 10:05pm          
          13  %                    N                    12-44                

 

 Mean Corpuscular Hemoglobin                    September 15, 2013 10:05pm     
               30  PG                    N                    25-34            
    

 

 Mean Corpuscular Hemoglobin Concent                    September 15, 2013 10:
05pm                    36  G/DL                    N                    32-36 
               

 

 Mean Corpuscular Volume                    September 15, 2013 10:05pm         
           85  FL                    N                    80-99                

 

 Mean Platelet Volume                    September 15, 2013 10:05pm            
        9.4  FL                    N                    7.4-10.4                

 

 Monocytes # (Auto)                    September 15, 2013 10:05pm              
      1.3  X 10^3                    H                    0.0-1.0              
  

 

 Monocytes (%) (Auto)                    September 15, 2013 10:05pm            
        10  %                    N                    0-12                

 

 Neutrophils # (Auto)                    September 15, 2013 10:05pm            
        9.1  X 10^3                    H                    1.8-7.8            
    

 

 Neutrophils (%) (Auto)                    September 15, 2013 10:05pm          
          75  %                    N                    42-75                

 

 Platelet Count                    September 15, 2013 10:05pm                  
  194  10^3/uL                    N                    130-400                

 

 Potassium Level                    September 15, 2013 10:05pm                 
   3.4  MMOL/L                    L                    3.6-5.0                

 

 Red Blood Count                    September 15, 2013 10:05pm                 
   4.08  10^6/uL                    L                    4.35-5.85             
   

 

 Red Cell Distribution Width                    September 15, 2013 10:05pm     
               14.7  %                    H                    10.0-14.5       
         

 

 Sodium Level                    September 15, 2013 10:05pm                    
136  MMOL/L                    N                    135-145                

 

 Total Bilirubin                    September 15, 2013 10:05pm                 
   0.3  MG/DL                    N                    0.0-1.0                

 

 Total Protein                    September 15, 2013 10:05pm                    
5.1  G/DL                    L                    6.4-8.2                

 

 Urine Bacteria                    September 15, 2013 9:50pm                    
NEGATIVE  /HPF                                         -                

 

 Urine Bilirubin                    September 15, 2013 9:50pm                  
  NEGATIVE                                         -                

 

 Urine Casts                    September 15, 2013 9:50pm                    
NONE  /LPF                                         -                

 

 Urine Clarity                    September 15, 2013 9:50pm                    
CLEAR                                         -                

 

 Urine Color                    September 15, 2013 9:50pm                    
YELLOW                                         -                

 

 Urine Crystals                    September 15, 2013 9:50pm                    
NONE  /LPF                                         -                

 

 Urine Culture Indicated                    September 15, 2013 9:50pm          
          YES                                         -                

 

 Urine Glucose (UA)                    September 15, 2013 9:50pm               
     NEGATIVE                                         -                

 

 Urine Ketones                    September 15, 2013 9:50pm                    
NEGATIVE                                         -                

 

 Urine Leukocyte Esterase                    September 15, 2013 9:50pm         
           NEGATIVE                                         -                

 

 Urine Mucus                    September 15, 2013 9:50pm                    
NEGATIVE  /LPF                                         -                

 

 Urine Nitrite                    September 15, 2013 9:50pm                    
NEGATIVE                                         -                

 

 Urine Protein                    September 15, 2013 9:50pm                    
NEGATIVE                                         -                

 

 Urine RBC                    September 15, 2013 9:50pm                    NONE
  /HPF                                         -                

 

 Urine Specific Gravity                    September 15, 2013 9:50pm           
         1.010                    L                    -                

 

 Urine Squamous Epithelial Cells                    September 15, 2013 9:50pm  
                  2-5  /HPF                                         -          
      

 

 Urine Urobilinogen                    September 15, 2013 9:50pm               
     NORMAL  MG/DL                                         -                

 

 Urine WBC                    September 15, 2013 9:50pm                    RARE
  /HPF                                         -                

 

 Urine pH                    September 15, 2013 9:50pm                    7    
                                     -                

 

 White Blood Count                    September 15, 2013 10:05pm               
     12.1  10^3/uL                    H                    4.3-11.0            
    

 

 Estimat Glomerular Filtration Rate                    September 15, 2013 10:
05pm                    > 60                                         -         
       

 

 Urine RBC (Auto)                    September 15, 2013 9:50pm                 
   NEGATIVE                                         -                



                                                                               
                                                                               
                                                                               
                                                                               
                                                                               
                                                                               
                                                                               
                                                                           



Procedures

                      





 Procedure                    Code                    Date                

 

 Urine Culture                                         09/15/13                



                                                                              



Encounters

                      





 Encounter                    Location                    Date/Time            
    

 

 Discharged Inpatient                    [facility]                    09/15/
13 9:15pm

## 2018-08-09 NOTE — DIAGNOSTIC IMAGING REPORT
PROCEDURE: US right lower extremity venous.



TECHNIQUE: Multiple real-time grayscale images were obtained over

the right lower extremity in various projections. Additional

duplex Doppler and color Doppler images were also obtained.



INDICATION: Right leg pain.



FINDINGS: The right common femoral, superficial femoral,

popliteal veins and tibial veins demonstrate normal response to

compression, augmentation, and Valsalva. There are no right lower

extremity fluid collections or masses.  There is a 2 cm segment

of superficial thrombus in the right upper calf.



IMPRESSION: No evidence of deep vein thrombosis in the right

lower extremity.  



Small focal area of superficial thrombophlebitis in the calf.





Dictated by: 



  Dictated on workstation # [id number]

## 2018-08-13 ENCOUNTER — HOSPITAL ENCOUNTER (OUTPATIENT)
Dept: HOSPITAL 75 - LAB | Age: 38
End: 2018-08-13
Attending: NURSE PRACTITIONER
Payer: COMMERCIAL

## 2018-08-13 DIAGNOSIS — I83.91: Primary | ICD-10-CM

## 2018-08-13 DIAGNOSIS — I82.90: ICD-10-CM

## 2018-08-13 LAB
APTT BLD: 28 SEC (ref 24–35)
D DIMER PPP FEU-MCNC: < 0.27 UG/ML (ref 0–0.49)
INR PPP: 1 (ref 0.8–1.4)
PROTHROMBIN TIME: 12.8 SEC (ref 12.2–14.7)

## 2018-08-13 PROCEDURE — 85610 PROTHROMBIN TIME: CPT

## 2018-08-13 PROCEDURE — 85379 FIBRIN DEGRADATION QUANT: CPT

## 2018-08-13 PROCEDURE — 85220 BLOOC CLOT FACTOR V TEST: CPT

## 2018-08-13 PROCEDURE — 36415 COLL VENOUS BLD VENIPUNCTURE: CPT

## 2018-08-13 PROCEDURE — 85730 THROMBOPLASTIN TIME PARTIAL: CPT

## 2019-04-05 ENCOUNTER — HOSPITAL ENCOUNTER (OUTPATIENT)
Dept: HOSPITAL 75 - RAD | Age: 39
End: 2019-04-05
Attending: FAMILY MEDICINE
Payer: COMMERCIAL

## 2019-04-05 DIAGNOSIS — Z12.31: Primary | ICD-10-CM

## 2019-04-05 PROCEDURE — 77067 SCR MAMMO BI INCL CAD: CPT

## 2019-04-05 NOTE — DIAGNOSTIC IMAGING REPORT
INDICATION: Routine screening.



COMPARISON: Prior mammogram from 1/17/2017.



EXAMINATION: 2D and 3D bilateral screening mammography was

performed with CAD. 



The current study was also evaluated with a Computer Aided

Detection (CAD) system.



FINDINGS: Both breasts are heterogeneously dense, limiting the

sensitivity of mammography. There are vascular calcifications,

bilaterally. No dominant mass or malignant appearing

microcalcifications are seen. The axillae are unremarkable.



IMPRESSION: No mammographic features suspicious for malignancy

are identified. 



ACR BI-RADS Category 1: Negative.

Result letter will be mailed to the patient.

Note:  At least 10% of breast cancer is not imaged by

mammography.





  Dictated on workstation # [id number]

## 2019-09-04 ENCOUNTER — HOSPITAL ENCOUNTER (OUTPATIENT)
Dept: HOSPITAL 75 - RAD | Age: 39
End: 2019-09-04
Attending: NURSE PRACTITIONER
Payer: COMMERCIAL

## 2019-09-04 DIAGNOSIS — M25.511: Primary | ICD-10-CM

## 2019-09-04 PROCEDURE — 73030 X-RAY EXAM OF SHOULDER: CPT

## 2019-09-04 NOTE — DIAGNOSTIC IMAGING REPORT
Clinical indication: Patient with no known injury. Patient has

increasing pain and decreased range of motion over last couple

months.



Exam: X-ray right shoulder, 3 views.



Comparison: None.



Findings:

There is a small calcification seen superiorly adjacent to the

proximal humeral tuberosity which may related to calcific

tendinopathy. There is no acute fracture or dislocation. The

glenohumeral joints otherwise unremarkable. The right

acromioclavicular joint is unremarkable.



Impression:

1: There is no acute fracture or dislocation.



2:  Possible calcific tendinopathy of the right shoulder region. 



Report was faxed to the office of KAILEY Brenner at 5:03

p.m., by minh (for FRANSICO). 

 



Dictated by: 



  Dictated on workstation # [id number]

## 2020-06-09 ENCOUNTER — HOSPITAL ENCOUNTER (OUTPATIENT)
Dept: HOSPITAL 75 - RAD | Age: 40
End: 2020-06-09
Attending: OBSTETRICS & GYNECOLOGY
Payer: COMMERCIAL

## 2020-06-09 DIAGNOSIS — Z12.31: Primary | ICD-10-CM

## 2020-06-09 PROCEDURE — 77067 SCR MAMMO BI INCL CAD: CPT

## 2020-06-09 PROCEDURE — 77063 BREAST TOMOSYNTHESIS BI: CPT

## 2020-06-09 NOTE — DIAGNOSTIC IMAGING REPORT
INDICATION: 

Routine screening.



COMPARISON:  

04/05/2019 and 01/17/2017.



TECHNIQUE: 

2D and 3D bilateral screening mammography was performed with CAD.



FINDINGS:

Both breasts are heterogeneously dense, limiting the sensitivity

of mammography. Benign parenchymal and vascular calcifications

are again noted. No mass or malignant appearing

microcalcifications are seen. The axillae are unremarkable.



IMPRESSION: 

No mammographic features suspicious for malignancy are

identified.



ACR BI-RADS Category 2: Benign findings.

Result letter will be mailed to the patient.

Note: At least 10% of breast cancer is not imaged by mammography.



Dictated by: 



  Dictated on workstation # [id number]

## 2021-06-11 ENCOUNTER — HOSPITAL ENCOUNTER (OUTPATIENT)
Dept: HOSPITAL 75 - RAD | Age: 41
End: 2021-06-11
Attending: OBSTETRICS & GYNECOLOGY
Payer: COMMERCIAL

## 2021-06-11 DIAGNOSIS — Z12.31: Primary | ICD-10-CM

## 2021-06-11 PROCEDURE — 77067 SCR MAMMO BI INCL CAD: CPT

## 2021-06-11 PROCEDURE — 77063 BREAST TOMOSYNTHESIS BI: CPT

## 2021-06-11 NOTE — DIAGNOSTIC IMAGING REPORT
Digital mammogram bilateral screening



This study was compared to the prior exams of 06/09/2020,

4/5/2019 and 1/17/2017. 



At this time there are no current complaints.



The fibroglandular tissue in both breasts is dense. This does

limit the sensitivity of this exam. In the interval since the

prior study, a small 5 to 6 mm rounded asymmetry has developed in

the 12 o'clock position of the left breast approximately 2 cm

from the nipple. The tomographic images suggests that this

asymmetry has a smooth margin and most likely this is a benign

process such as a cyst. A solid mass cannot be entirely excluded

however. I would recommend that ultrasound of this area be

performed for further evaluation.



IMPRESSION: 

The overall appearance of the breasts has not changed

significantly otherwise. There is no primary or secondary sign of

malignancy noted. However, ultrasound would be recommended for

further evaluation of the benign-appearing asymmetry in the 12

o'clock position of the left breast. 



ACR BI-RADS Category 0: Incomplete. (Needs additional imaging

evaluation).

Result letter will be mailed to the patient.

Note: At least 10% of breast cancer is not imaged by mammography.







Dictated by: 



  Dictated on workstation # [id number]

## 2021-06-14 ENCOUNTER — HOSPITAL ENCOUNTER (OUTPATIENT)
Dept: HOSPITAL 75 - RAD | Age: 41
End: 2021-06-14
Attending: OBSTETRICS & GYNECOLOGY
Payer: COMMERCIAL

## 2021-06-14 DIAGNOSIS — R92.2: Primary | ICD-10-CM

## 2021-06-14 PROCEDURE — 76642 ULTRASOUND BREAST LIMITED: CPT

## 2021-06-14 NOTE — DIAGNOSTIC IMAGING REPORT
INDICATION: 

Left breast density. Patient presents for additional views.



COMPARISON: 

Correlation is made with the screening mammogram from 06/11/2021.



FINDINGS:

Sonographic interrogation of the 12 o'clock location of the left

breast was performed. There appears to be a probable cluster of

cysts at this location in aggregate measuring 13 mm x 5 mm x 9

mm. No other masses are detected.



IMPRESSION: 

Probable cluster of cysts at the 12 o'clock location of the left

breast approximately 2 cm from the nipple accounting for the

mammographic density. The patient may return to routine annual

screening mammography.



ACR BI-RADS Category 2: Benign findings.



Dictated by: 



  Dictated on workstation # [id number]

## 2022-02-17 ENCOUNTER — HOSPITAL ENCOUNTER (OUTPATIENT)
Dept: HOSPITAL 75 - LAB | Age: 42
End: 2022-02-17
Attending: FAMILY MEDICINE
Payer: COMMERCIAL

## 2022-02-17 ENCOUNTER — HOSPITAL ENCOUNTER (OUTPATIENT)
Dept: HOSPITAL 75 - CARD | Age: 42
End: 2022-02-17
Attending: FAMILY MEDICINE
Payer: COMMERCIAL

## 2022-02-17 DIAGNOSIS — Z82.41: ICD-10-CM

## 2022-02-17 DIAGNOSIS — R00.0: ICD-10-CM

## 2022-02-17 DIAGNOSIS — Z84.89: ICD-10-CM

## 2022-02-17 DIAGNOSIS — R00.2: Primary | ICD-10-CM

## 2022-02-17 LAB
ALBUMIN SERPL-MCNC: 3.6 GM/DL (ref 3.2–4.5)
ALP SERPL-CCNC: 60 U/L (ref 40–136)
ALT SERPL-CCNC: 16 U/L (ref 0–55)
BASOPHILS # BLD AUTO: 0.1 10^3/UL (ref 0–0.1)
BASOPHILS NFR BLD AUTO: 1 % (ref 0–10)
BILIRUB SERPL-MCNC: 0.5 MG/DL (ref 0.1–1)
BUN/CREAT SERPL: 10
CALCIUM SERPL-MCNC: 8.7 MG/DL (ref 8.5–10.1)
CHLORIDE SERPL-SCNC: 108 MMOL/L (ref 98–107)
CHOLEST SERPL-MCNC: 192 MG/DL (ref ?–200)
CO2 SERPL-SCNC: 22 MMOL/L (ref 21–32)
CREAT SERPL-MCNC: 0.78 MG/DL (ref 0.6–1.3)
EOSINOPHIL # BLD AUTO: 0.1 10^3/UL (ref 0–0.3)
EOSINOPHIL NFR BLD AUTO: 1 % (ref 0–10)
GFR SERPLBLD BASED ON 1.73 SQ M-ARVRAT: 97 ML/MIN
GLUCOSE SERPL-MCNC: 96 MG/DL (ref 70–105)
HCT VFR BLD CALC: 38 % (ref 35–52)
HDLC SERPL-MCNC: 66 MG/DL (ref 40–60)
HGB BLD-MCNC: 12.6 G/DL (ref 11.5–16)
LYMPHOCYTES # BLD AUTO: 0.8 10^3/UL (ref 1–4)
LYMPHOCYTES NFR BLD AUTO: 9 % (ref 12–44)
MANUAL DIFFERENTIAL PERFORMED BLD QL: NO
MCH RBC QN AUTO: 27 PG (ref 25–34)
MCHC RBC AUTO-ENTMCNC: 33 G/DL (ref 32–36)
MCV RBC AUTO: 82 FL (ref 80–99)
MONOCYTES # BLD AUTO: 0.9 10^3/UL (ref 0–1)
MONOCYTES NFR BLD AUTO: 10 % (ref 0–12)
NEUTROPHILS # BLD AUTO: 7.5 10^3/UL (ref 1.8–7.8)
NEUTROPHILS NFR BLD AUTO: 79 % (ref 42–75)
PLATELET # BLD: 238 10^3/UL (ref 130–400)
PMV BLD AUTO: 9.7 FL (ref 9–12.2)
POTASSIUM SERPL-SCNC: 4.1 MMOL/L (ref 3.6–5)
PROT SERPL-MCNC: 6 GM/DL (ref 6.4–8.2)
SODIUM SERPL-SCNC: 139 MMOL/L (ref 135–145)
TRIGL SERPL-MCNC: 55 MG/DL (ref ?–150)
VLDLC SERPL CALC-MCNC: 11 MG/DL (ref 5–40)
WBC # BLD AUTO: 9.4 10^3/UL (ref 4.3–11)

## 2022-02-17 PROCEDURE — 85025 COMPLETE CBC W/AUTO DIFF WBC: CPT

## 2022-02-17 PROCEDURE — 86141 C-REACTIVE PROTEIN HS: CPT

## 2022-02-17 PROCEDURE — 84443 ASSAY THYROID STIM HORMONE: CPT

## 2022-02-17 PROCEDURE — 80053 COMPREHEN METABOLIC PANEL: CPT

## 2022-02-17 PROCEDURE — 93225 XTRNL ECG REC<48 HRS REC: CPT

## 2022-02-17 PROCEDURE — 36415 COLL VENOUS BLD VENIPUNCTURE: CPT

## 2022-02-17 PROCEDURE — 80061 LIPID PANEL: CPT

## 2022-02-17 PROCEDURE — 84436 ASSAY OF TOTAL THYROXINE: CPT

## 2022-02-17 PROCEDURE — 93226 XTRNL ECG REC<48 HR SCAN A/R: CPT

## 2022-02-17 PROCEDURE — 85379 FIBRIN DEGRADATION QUANT: CPT

## 2022-06-22 ENCOUNTER — HOSPITAL ENCOUNTER (OUTPATIENT)
Dept: HOSPITAL 75 - RAD | Age: 42
End: 2022-06-22
Attending: OBSTETRICS & GYNECOLOGY
Payer: COMMERCIAL

## 2022-06-22 DIAGNOSIS — Z12.31: Primary | ICD-10-CM

## 2022-06-22 PROCEDURE — 77063 BREAST TOMOSYNTHESIS BI: CPT

## 2022-06-22 PROCEDURE — 77067 SCR MAMMO BI INCL CAD: CPT

## 2022-06-22 NOTE — DIAGNOSTIC IMAGING REPORT
INDICATION: 

Routine screening.



COMPARISON:  

06/11/2021 and 06/09/2020.



TECHNIQUE: 

2D and 3D bilateral screening mammography was performed with CAD.



FINDINGS:

Both breasts are heterogeneously dense, limiting the sensitivity

of mammography. The parenchymal pattern is stable. No mass or

malignant-appearing microcalcifications are seen. The axillae are

unremarkable.



IMPRESSION: 

No mammographic features suspicious for malignancy are

identified.



ACR BI-RADS Category 1: Negative.

Result letter will be mailed to the patient.

Note:  At least 10% of breast cancer is not imaged by

mammography.



Dictated by: 



  Dictated on workstation # [id number]

## 2023-06-27 ENCOUNTER — HOSPITAL ENCOUNTER (OUTPATIENT)
Dept: HOSPITAL 75 - RAD | Age: 43
End: 2023-06-27
Attending: OBSTETRICS & GYNECOLOGY
Payer: COMMERCIAL

## 2023-06-27 DIAGNOSIS — Z12.31: Primary | ICD-10-CM

## 2023-06-27 PROCEDURE — 77067 SCR MAMMO BI INCL CAD: CPT

## 2023-06-27 PROCEDURE — 77063 BREAST TOMOSYNTHESIS BI: CPT

## 2023-06-27 NOTE — DIAGNOSTIC IMAGING REPORT
INDICATION: 

Routine screening.



COMPARISON: 

06/22/2022 and 06/11/2021.



TECHNIQUE: 

2D and 3D bilateral screening mammography was performed with CAD.



FINDINGS:

Both breasts are heterogeneously dense, limiting the sensitivity

of mammography. The parenchymal pattern is stable. No mass or

malignant-appearing microcalcifications are seen. The axillae are

unremarkable.



IMPRESSION: 

No mammographic features suspicious for malignancy are

identified.



ACR BI-RADS Category 1: Negative.

Result letter will be mailed to the patient.

Note:  At least 10% of breast cancer is not imaged by

mammography.



Dictated by: 



  Dictated on workstation # [id number]